# Patient Record
Sex: MALE | Race: BLACK OR AFRICAN AMERICAN | Employment: UNEMPLOYED | URBAN - METROPOLITAN AREA
[De-identification: names, ages, dates, MRNs, and addresses within clinical notes are randomized per-mention and may not be internally consistent; named-entity substitution may affect disease eponyms.]

---
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
## 2023-10-04 ENCOUNTER — OFFICE VISIT (OUTPATIENT)
Dept: PRIMARY CARE CLINIC | Age: 18
End: 2023-10-04

## 2023-10-04 VITALS
OXYGEN SATURATION: 96 % | BODY MASS INDEX: 37.46 KG/M2 | TEMPERATURE: 98.2 F | HEIGHT: 72 IN | DIASTOLIC BLOOD PRESSURE: 77 MMHG | HEART RATE: 64 BPM | WEIGHT: 276.6 LBS | SYSTOLIC BLOOD PRESSURE: 117 MMHG

## 2023-10-04 DIAGNOSIS — F41.1 GAD (GENERALIZED ANXIETY DISORDER): ICD-10-CM

## 2023-10-04 DIAGNOSIS — R45.89 DEPRESSED MOOD: Primary | ICD-10-CM

## 2023-10-04 DIAGNOSIS — R41.840 ATTENTION AND CONCENTRATION DEFICIT: ICD-10-CM

## 2023-10-04 SDOH — ECONOMIC STABILITY: HOUSING INSECURITY
IN THE LAST 12 MONTHS, WAS THERE A TIME WHEN YOU DID NOT HAVE A STEADY PLACE TO SLEEP OR SLEPT IN A SHELTER (INCLUDING NOW)?: NO

## 2023-10-04 SDOH — ECONOMIC STABILITY: FOOD INSECURITY: WITHIN THE PAST 12 MONTHS, YOU WORRIED THAT YOUR FOOD WOULD RUN OUT BEFORE YOU GOT MONEY TO BUY MORE.: NEVER TRUE

## 2023-10-04 SDOH — ECONOMIC STABILITY: INCOME INSECURITY: HOW HARD IS IT FOR YOU TO PAY FOR THE VERY BASICS LIKE FOOD, HOUSING, MEDICAL CARE, AND HEATING?: NOT HARD AT ALL

## 2023-10-04 SDOH — ECONOMIC STABILITY: FOOD INSECURITY: WITHIN THE PAST 12 MONTHS, THE FOOD YOU BOUGHT JUST DIDN'T LAST AND YOU DIDN'T HAVE MONEY TO GET MORE.: NEVER TRUE

## 2023-10-04 ASSESSMENT — ENCOUNTER SYMPTOMS
CHEST TIGHTNESS: 0
WHEEZING: 0
GASTROINTESTINAL NEGATIVE: 1
COUGH: 0
SHORTNESS OF BREATH: 0

## 2023-10-04 ASSESSMENT — PATIENT HEALTH QUESTIONNAIRE - PHQ9
1. LITTLE INTEREST OR PLEASURE IN DOING THINGS: 2
5. POOR APPETITE OR OVEREATING: 2
7. TROUBLE CONCENTRATING ON THINGS, SUCH AS READING THE NEWSPAPER OR WATCHING TELEVISION: 3
SUM OF ALL RESPONSES TO PHQ QUESTIONS 1-9: 22
2. FEELING DOWN, DEPRESSED OR HOPELESS: 3
8. MOVING OR SPEAKING SO SLOWLY THAT OTHER PEOPLE COULD HAVE NOTICED. OR THE OPPOSITE, BEING SO FIGETY OR RESTLESS THAT YOU HAVE BEEN MOVING AROUND A LOT MORE THAN USUAL: 1
3. TROUBLE FALLING OR STAYING ASLEEP: 3
6. FEELING BAD ABOUT YOURSELF - OR THAT YOU ARE A FAILURE OR HAVE LET YOURSELF OR YOUR FAMILY DOWN: 3
SUM OF ALL RESPONSES TO PHQ QUESTIONS 1-9: 22
4. FEELING TIRED OR HAVING LITTLE ENERGY: 3
SUM OF ALL RESPONSES TO PHQ QUESTIONS 1-9: 22
SUM OF ALL RESPONSES TO PHQ QUESTIONS 1-9: 20
10. IF YOU CHECKED OFF ANY PROBLEMS, HOW DIFFICULT HAVE THESE PROBLEMS MADE IT FOR YOU TO DO YOUR WORK, TAKE CARE OF THINGS AT HOME, OR GET ALONG WITH OTHER PEOPLE: 3
9. THOUGHTS THAT YOU WOULD BE BETTER OFF DEAD, OR OF HURTING YOURSELF: 2
SUM OF ALL RESPONSES TO PHQ9 QUESTIONS 1 & 2: 5

## 2023-10-04 ASSESSMENT — COLUMBIA-SUICIDE SEVERITY RATING SCALE - C-SSRS
6. HAVE YOU EVER DONE ANYTHING, STARTED TO DO ANYTHING, OR PREPARED TO DO ANYTHING TO END YOUR LIFE?: NO
1. WITHIN THE PAST MONTH, HAVE YOU WISHED YOU WERE DEAD OR WISHED YOU COULD GO TO SLEEP AND NOT WAKE UP?: YES
2. HAVE YOU ACTUALLY HAD ANY THOUGHTS OF KILLING YOURSELF?: NO

## 2023-10-04 NOTE — PROGRESS NOTES
Socioeconomic History    Marital status: Single     Spouse name: None    Number of children: None    Years of education: None    Highest education level: None   Tobacco Use    Smoking status: Never    Smokeless tobacco: Never   Vaping Use    Vaping Use: Never used   Substance and Sexual Activity    Alcohol use: Not Currently    Drug use: Never     Social Determinants of Health     Financial Resource Strain: Low Risk  (10/4/2023)    Overall Financial Resource Strain (CARDIA)     Difficulty of Paying Living Expenses: Not hard at all   Food Insecurity: No Food Insecurity (10/4/2023)    Hunger Vital Sign     Worried About Running Out of Food in the Last Year: Never true     Ran Out of Food in the Last Year: Never true   Transportation Needs: Unknown (10/4/2023)    PRAPARE - Transportation     Lack of Transportation (Non-Medical): No   Housing Stability: Unknown (10/4/2023)    Housing Stability Vital Sign     Unstable Housing in the Last Year: No       Family History   Problem Relation Age of Onset    High Cholesterol Mother           No current outpatient medications on file. No current facility-administered medications for this visit. No Known Allergies    REVIEW OF SYSTEMS  Review of Systems   Constitutional:  Positive for activity change, appetite change and fatigue. Negative for chills, diaphoresis, fever and unexpected weight change. HENT: Negative. Respiratory:  Negative for cough, chest tightness, shortness of breath and wheezing. Cardiovascular:  Negative for chest pain, palpitations and leg swelling. Gastrointestinal: Negative. Endocrine: Negative for cold intolerance, heat intolerance, polydipsia, polyphagia and polyuria. Musculoskeletal: Negative. Skin: Negative. Neurological: Negative. Negative for headaches. Psychiatric/Behavioral:  Positive for decreased concentration, dysphoric mood, sleep disturbance and suicidal ideas (passive).  Negative for agitation, behavioral

## 2023-10-06 ENCOUNTER — OFFICE VISIT (OUTPATIENT)
Dept: PSYCHOLOGY | Age: 18
End: 2023-10-06

## 2023-10-06 DIAGNOSIS — F32.1 MDD (MAJOR DEPRESSIVE DISORDER), SINGLE EPISODE, MODERATE (HCC): Primary | ICD-10-CM

## 2023-10-06 RX ORDER — BUPROPION HYDROCHLORIDE 150 MG/1
150 TABLET ORAL EVERY MORNING
Qty: 14 TABLET | Refills: 0 | Status: SHIPPED | OUTPATIENT
Start: 2023-10-06 | End: 2023-10-20

## 2023-10-06 RX ORDER — BUPROPION HYDROCHLORIDE 300 MG/1
300 TABLET ORAL EVERY MORNING
Qty: 30 TABLET | Refills: 0 | Status: SHIPPED | OUTPATIENT
Start: 2023-10-20

## 2023-10-06 NOTE — PROGRESS NOTES
HISTORY: As noted. No current therapist. No history of psychiatric hospitalizations or suicide attempts. No prior medication trials. PAST MEDICAL HISTORY: Otherwise healthy male on no medication. FAMILY PSYCHIATRIC HISTORY: Mother has anxiety and ADHD; she is prescribed Adderall. Father has been on multiple antidepressants. SOCIAL HISTORY: Patient grew up in [place], [place] with both parents. Childhood generally unremarkable. Denies history of abuse or bullying. Patient has one younger brother. This is his third year at [employer] and he is studying music. SUBSTANCE ABUSE HISTORY: Denies. MENTAL STATUS EXAM: White male appears age. Pleasant, cooperative, forthcoming. Normal psychomotor activity, gait, strength, tone, eye contact. Mood depressed. Affect blunted but flexible. Speech clear. Thoughts organized. Content future-oriented. Themes of depression, generalized anxiety and inattention. No suicidal or homicidal ideations. No paranoia, delusions, hallucinations. Orientation, concentration, recent and remote memory are grossly intact. Fund of knowledge fair. Language use fair. Insight and judgment fair. MEDICATIONS:   Wellbutrin  mg daily for 14 days then 300 mg daily (new)     ASSESSMENT:  MDD single episode moderate      Persistent Depressive Disorder (Dysthymia)     ADHD Inattentive Type     ALFONSO    PLAN: Support and reassurance provided. Discussed diagnostic impressions and treatment recommendations. Patient  feels depression and ADHD symptoms are causing the most impairment - will initiate trial of Wellbutrin for mood, energy, motivation and concentration. Risks/benefits/alternatives discussed. Stimulant could be added later if needed. Also if anxiety is still a prominent issue we may consider augmentation with SSRI as well. Continue to monitor symptoms, side effects and response to medication. Adjust treatment as needed.  Strongly recommended patient start cognitive behavioral therapy at

## 2023-11-03 ENCOUNTER — OFFICE VISIT (OUTPATIENT)
Dept: PSYCHOLOGY | Age: 18
End: 2023-11-03

## 2023-11-03 DIAGNOSIS — R53.82 CHRONIC FATIGUE: Primary | ICD-10-CM

## 2023-11-03 RX ORDER — BUPROPION HYDROCHLORIDE 300 MG/1
300 TABLET ORAL EVERY MORNING
Qty: 30 TABLET | Refills: 0 | Status: SHIPPED | OUTPATIENT
Start: 2023-11-03

## 2023-11-03 NOTE — PROGRESS NOTES
PSYCHIATRY ATTENDING NOTE    CC: \"Somewhat better. \"    S: Patient being seen at [facility] in follow-up for depression, ADHD and anxiety. Wellbutrin initiated last appointment. Met with patient to discuss progress with treatment. In better spirits  Reports mood and attentiveness have improved with Wellbutrin  Tolerating the medication without incident  Also reports feeling less anxious overall  Does complain of low energy and fatigue still - has never had blood work  Did not go to counseling center yet; he may hold off with this  Doing better academically - dropped one class and passing the rest now  No acute issues otherwise     MSE: White male appears age. Pleasant, cooperative, forthcoming. Normal psychomotor activity, gait, strength, tone, eye contact. Mood improving. Flexible affect. Speech clear. Thoughts organized. Content future-oriented. No suicidal or homicidal ideations. No paranoia, delusions, hallucinations. Orientation, concentration, recent and remote memory are grossly intact. Fund of knowledge fair. Language use fair. Insight and judgment fair. MEDICATIONS:  Wellbutrin  mg daily (cont)    ASSESSMENT:     MDD single episode moderate   Persistent Depressive Disorder (Dysthymia)  ADHD Inattentive Type  ALFONSO    PLAN: Patient responding well to the medication - continue same dose for now. Given ongoing complaints related to fatigue we will check CBC, CMP, TSH, Free T4 and Vitamin D level. See back approximately one month.                            Electronically signed by Ranjith Hays MD on 11/3/2023 at 10:32 AM

## 2023-12-08 ENCOUNTER — OFFICE VISIT (OUTPATIENT)
Dept: PSYCHOLOGY | Age: 18
End: 2023-12-08

## 2023-12-08 DIAGNOSIS — F32.1 MDD (MAJOR DEPRESSIVE DISORDER), SINGLE EPISODE, MODERATE (HCC): Primary | ICD-10-CM

## 2023-12-08 RX ORDER — FLUOXETINE HYDROCHLORIDE 20 MG/1
20 CAPSULE ORAL DAILY
Qty: 30 CAPSULE | Refills: 1 | Status: SHIPPED | OUTPATIENT
Start: 2023-12-08

## 2023-12-08 RX ORDER — BUPROPION HYDROCHLORIDE 300 MG/1
300 TABLET ORAL EVERY MORNING
Qty: 30 TABLET | Refills: 1 | Status: SHIPPED | OUTPATIENT
Start: 2023-12-08

## 2023-12-08 NOTE — PROGRESS NOTES
PSYCHIATRY ATTENDING NOTE    CC: \"So so. \"    S: Patient being seen at [facility] in follow-up for depression, ADHD and anxiety. Met with patient to discuss progress with treatment. Mildly dysphoric today  Reports effect from Wellbutrin has tapered some  Notes dysphoria, diminished energy and decreased motivation  Never started therapy or got the blood work done  [employer] if he is going to pass all his classes this semester  Discussed increasing Wellbutrin vs antidepressant augmentation    MSE: White male appears age. Pleasant, cooperative, forthcoming. Normal psychomotor activity, gait, strength, tone, eye contact. Mood dysphoric. Affect congruent. Speech clear. Thoughts organized. Content future-oriented. No suicidal or homicidal ideations. No paranoia, delusions, hallucinations. Orientation, concentration, recent and remote memory are grossly intact. Fund of knowledge fair. Language use fair. Insight and judgment fair. MEDICATIONS:  Wellbutrin  mg daily (cont)     Prozac 20 mg daily (new)    ASSESSMENT:     MDD single episode moderate   Persistent Depressive Disorder (Dysthymia)  ADHD Inattentive Type  ALFONSO    PLAN: Augment Wellbutrin with Prozac. Risks/benefits/alternatives discussed. Encouraged blood work. See back six weeks - call sooner if issues.                            Electronically signed by Barby Justice MD on 12/8/2023 at 11:13 AM